# Patient Record
Sex: MALE | Race: WHITE
[De-identification: names, ages, dates, MRNs, and addresses within clinical notes are randomized per-mention and may not be internally consistent; named-entity substitution may affect disease eponyms.]

---

## 2018-01-04 ENCOUNTER — HOSPITAL ENCOUNTER (EMERGENCY)
Dept: HOSPITAL 80 - FED | Age: 68
Discharge: HOME | End: 2018-01-04
Payer: COMMERCIAL

## 2018-01-04 VITALS
OXYGEN SATURATION: 96 % | DIASTOLIC BLOOD PRESSURE: 76 MMHG | SYSTOLIC BLOOD PRESSURE: 128 MMHG | RESPIRATION RATE: 16 BRPM | HEART RATE: 64 BPM

## 2018-01-04 VITALS — TEMPERATURE: 97.2 F

## 2018-01-04 DIAGNOSIS — R31.9: Primary | ICD-10-CM
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
## 2018-01-04 DIAGNOSIS — E86.9: ICD-10-CM

## 2018-01-04 LAB
CK SERPL-CCNC: 351 IU/L (ref 0–224)
INR PPP: 0.99 (ref 0.83–1.16)
PLATELET # BLD: 210 10^3/UL (ref 150–400)
PROTHROMBIN TIME: 13.3 SEC (ref 12–15)

## 2018-01-04 PROCEDURE — 96360 HYDRATION IV INFUSION INIT: CPT

## 2018-01-04 PROCEDURE — 99285 EMERGENCY DEPT VISIT HI MDM: CPT

## 2018-01-04 PROCEDURE — 74177 CT ABD & PELVIS W/CONTRAST: CPT

## 2018-01-04 NOTE — EDPHY
H & P


Stated Complaint: HIT BY SNOWBOARDER/R FLANK/BLOOD IN URINE


Time Seen by Provider: 01/04/18 17:32


HPI/ROS: 





CHIEF COMPLAINT:  Right flank pain





HISTORY OF PRESENT ILLNESS:  The patient is a 67-year-old man who comes to the 

emergency department complaining of right flank pain and hematuria.  He states 

that he was skiing this afternoon when he was hit from behind by a snow 

boarder.  It knocked the wind out of bed he felt like he recovered and skate 

for another hour or 2.  He then drove to the [id number] but began 

having severe pain in the right side.  He then noticed dark hematuria in the 

urinal.  He then drove home and has had continued pain for the last 2 hr.  No 

fever.  No chest pain or shortness of breath. No head or neck pain. No 

extremity injuries.








REVIEW OF SYSTEMS:


Constitutional:  denies: chills, fever, recent illness, recent injury


EENTM: denies: blurred vision, double vision, nose congestion


Respiratory: denies: cough, shortness of breath


Cardiac: denies: chest pain, irregular heart rate, lightheadedness, palpitations


Gastrointestinal/Abdominal: denies: abdominal pain, diarrhea, nausea, vomiting, 

blood streaked stools


Genitourinary:  See HPI


Musculoskeletal: denies: joint pain, muscle pain


Skin: denies: lesions, rash, jaundice, bruising


Neurological: denies: headache, numbness, paresthesia, tingling, dizziness, 

weakness


Hematologic/Lymphatic: denies: blood clots, easy bleeding, easy bruising


Immunologic/allergic: denies: HIV/AIDS, transplant








EXAM:


GENERAL:  Well-appearing, well-nourished and in no acute distress.


HEAD:  Atraumatic, normocephalic.


EYES:  Pupils equal round and reactive to light, extraocular movements intact, 

sclera anicteric, conjunctiva are normal.


ENT:  TMs normal, nares patent, oropharynx clear without exudates.  Moist 

mucous membranes.


NECK:  Normal range of motion, supple without lymphadenopathy or JVD.


LUNGS:  Breath sounds clear to auscultation bilaterally and equal.  No wheezes 

rales or rhonchi.


HEART:  Regular rate and rhythm without murmurs, rubs or gallops.


ABDOMEN:  Soft, nontender, normoactive bowel sounds.  No guarding, no rebound.  

No masses appreciated.


BACK:  No CVA tenderness, no spinal tenderness, step-offs or deformities, no 

bruising


EXTREMITIES:  Normal range of motion, no pitting or edema.  No clubbing or 

cyanosis.


NEUROLOGICAL:  Cranial nerves II through XII grossly intact.  Normal speech, 

normal gait.  5/5 strength, normal movement in all extremities, normal sensation


PSYCH:  Normal mood, normal affect.


SKIN:  Warm, dry, normal turgor, no visible rashes or lesions.








Source: Patient


Exam Limitations: No limitations





- Personal History


Current Tetanus/Diphtheria Vaccine: Yes





- Medical/Surgical History


Hx Asthma: No


Hx Chronic Respiratory Disease: No


Hx Diabetes: No


Hx Cardiac Disease: No


Hx Renal Disease: No


Hx Cirrhosis: No


Hx Alcoholism: No


Hx HIV/AIDS: No


Hx Splenectomy or Spleen Trauma: No


Other PMH: DENIES





- Family History


Significant Family History: No pertinent family hx





- Social History


Smoking Status: Never smoked


Alcohol Use: Sober


Drug Use: None


Constitutional: 


 Initial Vital Signs











Temperature (C)  36.2 C   01/04/18 17:22


 


Heart Rate  68   01/04/18 17:22


 


Respiratory Rate  18   01/04/18 17:22


 


Blood Pressure  133/92 H  01/04/18 17:22


 


O2 Sat (%)  95   01/04/18 17:22








 











O2 Delivery Mode               Room Air














Allergies/Adverse Reactions: 


 





No Known Allergies Allergy (Unverified 01/04/18 17:22)


 








Home Medications: 














 Medication  Instructions  Recorded


 


NK [No Known Home Meds]  01/04/18














Medical Decision Making





- Diagnostics


Imaging: Discussed imaging studies w/ On call Radiologist


ED Course/Re-evaluation: 





7:40 p.m. I discussed the case with Dr. Cole who is on-call for surgery.  

He recommended follow-up with PCP in 6 months for the renal adenoma versus 

hematoma.  We discussed the lab and imaging results.  The patient is feeling 

much better.  No stones seen on CT either.  He will try to give us a urine 

sample.





8:50 p.m. we discussed the patient's urinalysis results.  I suspect he has a 

microscopic injury but not macroscopic enough to see on CT scan.  We discussed 

treatment and prognosis for this which is basically expected management.  The 

patient is wife are happy with this.  They declined pain medication.  I 

recommended Tylenol rather than ibuprofen if he takes something at home.  They 

understand and eager to be discharged.


Differential Diagnosis: 





Partial list of the Differential diagnosis considered include but were not 

limited to;  renal contusion, fracture, bladder injury and although unlikely 

based on the history and physical exam, I also considered kidney stone, liver 

injury, thoracic injury.  I discussed these differential diagnoses and the plan 

with the patient as well as the usual and expected course.  The patient 

understands that the diagnosis is provisional and that in medicine we are not 

always correct and that further workup is often warranted.  Usual and customary 

warnings were given.  All of the patient's questions were answered.  The 

patient was instructed to return to the emergency department should the 

symptoms at all worsen or return, otherwise to followup with the physician as 

we discussed.





- Data Points


Laboratory Results: 


 Laboratory Results





 01/04/18 17:45 





 01/04/18 17:45 





 











  01/04/18 01/04/18 01/04/18





  20:09 17:45 17:45


 


WBC      





    


 


RBC      





    


 


Hgb      





    


 


Hct      





    


 


MCV      





    


 


MCH      





    


 


MCHC      





    


 


RDW      





    


 


Plt Count      





    


 


MPV      





    


 


Neut % (Auto)      





    


 


Lymph % (Auto)      





    


 


Mono % (Auto)      





    


 


Eos % (Auto)      





    


 


Baso % (Auto)      





    


 


Nucleat RBC Rel Count      





    


 


Absolute Neuts (auto)      





    


 


Absolute Lymphs (auto)      





    


 


Absolute Monos (auto)      





    


 


Absolute Eos (auto)      





    


 


Absolute Basos (auto)      





    


 


Absolute Nucleated RBC      





    


 


Immature Gran %      





    


 


Immature Gran #      





    


 


PT      





    


 


INR      





    


 


APTT      





    


 


Sodium      





    


 


Potassium      





    


 


Chloride      





    


 


Carbon Dioxide      





    


 


Anion Gap      





    


 


BUN      





    


 


Creatinine      





    


 


Estimated GFR      





    


 


Glucose      





    


 


Calcium      





    


 


Creatine Kinase    351 IU/L H IU/L  





    (0-224)  


 


CK-MB (CK-2) Fraction    7.25 ng/mL H ng/mL  





    (0.00-3.19)  


 


CK-MB (CK-2) %    2.1 % %  





    (0.0-4.0)  


 


Creatine Kinase Interp    NEGATIVE   





    (NEGATIVE)  


 


Urine Color  YELLOW     





    


 


Urine Appearance  HAZY     





    


 


Urine pH  5.0     





   (5.0-7.5)   


 


Ur Specific Gravity  > 1.035  H     





   (1.002-1.030)   


 


Urine Protein  NEGATIVE     





   (NEGATIVE)   


 


Urine Ketones  1+  H     





   (NEGATIVE)   


 


Urine Blood  3+  H     





   (NEGATIVE)   


 


Urine Nitrate  NEGATIVE     





   (NEGATIVE)   


 


Urine Bilirubin  NEGATIVE     





   (NEGATIVE)   


 


Urine Urobilinogen  NEGATIVE EU EU    





   (0.2-1.0)   


 


Ur Leukocyte Esterase  NEGATIVE     





   (NEGATIVE)   


 


Urine RBC   /hpf H /hpf    





   (0-3)   


 


Urine WBC  3-5 /hpf H /hpf    





   (0-3)   


 


Ur Epithelial Cells  NONE SEEN /lpf /lpf    





   (NONE-1+)   


 


Urine Mucus  TRACE /lpf /lpf    





   (NONE-1+)   


 


Urine Glucose  NEGATIVE     





   (NEGATIVE)   


 


Patient ABO/Rh      A POSITIVE 





    


 


Antibody Screen      NEGATIVE 





    














  01/04/18 01/04/18 01/04/18





  17:45 17:45 17:45


 


WBC      11.10 10^3/uL H 10^3/uL





     (3.80-9.50) 


 


RBC      5.57 10^6/uL 10^6/uL





     (4.40-6.38) 


 


Hgb      16.9 g/dL g/dL





     (13.7-17.5) 


 


Hct      48.6 % %





     (40.0-51.0) 


 


MCV      87.3 fL fL





     (81.5-99.8) 


 


MCH      30.3 pg pg





     (27.9-34.1) 


 


MCHC      34.8 g/dL g/dL





     (32.4-36.7) 


 


RDW      13.1 % %





     (11.5-15.2) 


 


Plt Count      210 10^3/uL 10^3/uL





     (150-400) 


 


MPV      9.9 fL fL





     (8.7-11.7) 


 


Neut % (Auto)      85.2 % H %





     (39.3-74.2) 


 


Lymph % (Auto)      7.4 % L %





     (15.0-45.0) 


 


Mono % (Auto)      6.9 % %





     (4.5-13.0) 


 


Eos % (Auto)      0.0 % L %





     (0.6-7.6) 


 


Baso % (Auto)      0.3 % %





     (0.3-1.7) 


 


Nucleat RBC Rel Count      0.0 % %





     (0.0-0.2) 


 


Absolute Neuts (auto)      9.46 10^3/uL H 10^3/uL





     (1.70-6.50) 


 


Absolute Lymphs (auto)      0.82 10^3/uL L 10^3/uL





     (1.00-3.00) 


 


Absolute Monos (auto)      0.77 10^3/uL 10^3/uL





     (0.30-0.80) 


 


Absolute Eos (auto)      0.00 10^3/uL L 10^3/uL





     (0.03-0.40) 


 


Absolute Basos (auto)      0.03 10^3/uL 10^3/uL





     (0.02-0.10) 


 


Absolute Nucleated RBC      0.00 10^3/uL 10^3/uL





     (0-0.01) 


 


Immature Gran %      0.2 % %





     (0.0-1.1) 


 


Immature Gran #      0.02 10^3/uL 10^3/uL





     (0.00-0.10) 


 


PT    13.3 SEC SEC  





    (12.0-15.0)  


 


INR    0.99   





    (0.83-1.16)  


 


APTT    25.3 SEC SEC  





    (23.0-38.0)  


 


Sodium  141 mEq/L mEq/L    





   (134-144)   


 


Potassium  4.1 mEq/L mEq/L    





   (3.5-5.2)   


 


Chloride  103 mEq/L mEq/L    





   ()   


 


Carbon Dioxide  24 mEq/l mEq/l    





   (22-31)   


 


Anion Gap  14 mEq/L mEq/L    





   (8-16)   


 


BUN  21 mg/dL mg/dL    





   (7-23)   


 


Creatinine  1.1 mg/dL mg/dL    





   (0.7-1.3)   


 


Estimated GFR  > 60     





    


 


Glucose  115 mg/dL H mg/dL    





   ()   


 


Calcium  9.9 mg/dL mg/dL    





   (8.5-10.4)   


 


Creatine Kinase      





    


 


CK-MB (CK-2) Fraction      





    


 


CK-MB (CK-2) %      





    


 


Creatine Kinase Interp      





    


 


Urine Color      





    


 


Urine Appearance      





    


 


Urine pH      





    


 


Ur Specific Gravity      





    


 


Urine Protein      





    


 


Urine Ketones      





    


 


Urine Blood      





    


 


Urine Nitrate      





    


 


Urine Bilirubin      





    


 


Urine Urobilinogen      





    


 


Ur Leukocyte Esterase      





    


 


Urine RBC      





    


 


Urine WBC      





    


 


Ur Epithelial Cells      





    


 


Urine Mucus      





    


 


Urine Glucose      





    


 


Patient ABO/Rh      





    


 


Antibody Screen      





    











Medications Given: 


 








Discontinued Medications





Sodium Chloride (Ns)  1,000 mls @ 0 mls/hr IV ONCE ONE; Wide Open


   PRN Reason: Protocol


   Stop: 01/04/18 17:38


   Last Admin: 01/04/18 17:52 Dose:  1,000 mls








Departure





- Departure


Disposition: Home, Routine, Self-Care


Clinical Impression: 


 Traumatic hematuria





Condition: Fair


Instructions:  Hematuria (ED)


Additional Instructions: 


Follow-up with your doctor and have a repeat ultrasound or CT scan of your 

right adrenal gland in 6 months to evaluate the adenoma versus hematoma that we 

saw tonight.


Referrals: 


NONE *PRIMARY CARE P,. [Primary Care Provider] - As per Instructions


Maico Brumfield MD [Medical Doctor] - 2-3 days, if not improved

## 2018-07-10 ENCOUNTER — HOSPITAL ENCOUNTER (OUTPATIENT)
Dept: HOSPITAL 80 - FIMAGING | Age: 68
End: 2018-07-10
Attending: SURGERY
Payer: COMMERCIAL

## 2018-07-10 DIAGNOSIS — S37.011S: Primary | ICD-10-CM

## 2018-07-10 DIAGNOSIS — M43.16: ICD-10-CM

## 2018-07-10 DIAGNOSIS — M89.38: ICD-10-CM

## 2018-07-10 PROCEDURE — 74177 CT ABD & PELVIS W/CONTRAST: CPT
